# Patient Record
Sex: FEMALE | Race: WHITE | Employment: FULL TIME | URBAN - METROPOLITAN AREA
[De-identification: names, ages, dates, MRNs, and addresses within clinical notes are randomized per-mention and may not be internally consistent; named-entity substitution may affect disease eponyms.]

---

## 2017-10-04 ENCOUNTER — HOSPITAL ENCOUNTER (OUTPATIENT)
Age: 27
Discharge: HOME | End: 2017-10-04
Payer: COMMERCIAL

## 2017-10-04 VITALS
DIASTOLIC BLOOD PRESSURE: 90 MMHG | OXYGEN SATURATION: 100 % | BODY MASS INDEX: 21 KG/M2 | SYSTOLIC BLOOD PRESSURE: 131 MMHG | RESPIRATION RATE: 17 BRPM | WEIGHT: 120 LBS | HEART RATE: 99 BPM | TEMPERATURE: 99 F

## 2017-10-04 DIAGNOSIS — J11.1 INFLUENZA: Primary | ICD-10-CM

## 2017-10-04 PROCEDURE — 87081 CULTURE SCREEN ONLY: CPT | Performed by: PHYSICIAN ASSISTANT

## 2017-10-04 PROCEDURE — 99204 OFFICE O/P NEW MOD 45 MIN: CPT

## 2017-10-04 PROCEDURE — 87430 STREP A AG IA: CPT | Performed by: PHYSICIAN ASSISTANT

## 2017-10-04 PROCEDURE — 94640 AIRWAY INHALATION TREATMENT: CPT

## 2017-10-04 RX ORDER — OSELTAMIVIR PHOSPHATE 75 MG/1
75 CAPSULE ORAL 2 TIMES DAILY
Qty: 10 CAPSULE | Refills: 0 | Status: SHIPPED | OUTPATIENT
Start: 2017-10-04 | End: 2017-10-09

## 2017-10-04 RX ORDER — IPRATROPIUM BROMIDE AND ALBUTEROL SULFATE 2.5; .5 MG/3ML; MG/3ML
3 SOLUTION RESPIRATORY (INHALATION) ONCE
Status: COMPLETED | OUTPATIENT
Start: 2017-10-04 | End: 2017-10-04

## 2017-10-04 NOTE — ED INITIAL ASSESSMENT (HPI)
Monday Pt c/o sore throat, chills/body aches, fever (Tmax 101.7), Pt is a teacher, strep throat in school.

## 2017-10-04 NOTE — ED PROVIDER NOTES
Patient Seen in: [id number] [facility]    History   Patient presents with:  Sore Throat  Fever (infectious)  Body ache and/or chills    Stated Complaint: sore throat    HPI    CHIEF COMPLAINT: Sore throat, cough, congestion and body aches     H Smokeless tobacco: Never Used                      Alcohol use: Yes           0.0 oz/week      Review of Systems    Positive for stated complaint: sore throat  Other systems are as noted in HPI.   Constitutional and vital si inhaler as previously prescribed. She can take over-the-counter cough and cold medication for symptom relief. She should take Tylenol or ibuprofen as needed for pain or fever. She was encouraged to rest and drink plenty of fluids.   A prescription for Ta

## 2022-10-21 NOTE — ED INITIAL ASSESSMENT (HPI)
Pt has had sinus pressure and if able to blow out secretions thick and yellow. Pt with hx of sinus infections and states it feels same.  Denies fevers

## 2024-03-18 NOTE — PATIENT INSTRUCTIONS
1. Rest. Drink plenty of fluids.     2. Supportive care as discussed.     3. Follow up with PMD in 4-5 days for re-eval. Go to the emergency department immediately if symptoms worsen, change, you develop chest discomfort, wheezing, shortness of breath, or if you have any concerns.

## 2024-03-18 NOTE — PROGRESS NOTES
CHIEF COMPLAINT:     Chief Complaint   Patient presents with    Sinus Problem     Sinus infection for multiple days not going away - Entered by patient  Started 5 days, no fevers          HPI:   Debbie Hughes is a 33 year old female who presents for sinus congesiton/runny nose x 5 days.  Patient reports she has been using otc allergy medications and has taken leftover amoxicillin for the past 4 days with no relief.  Denies any fevers, cough wheezing, chest discomfort, or shortness of breath.   Tolerates PO well at home. No n/v/d.  Denies any other aggravating or relieving factors at home. Denies any other treatment attempts prior to arrival.       Current Outpatient Medications   Medication Sig Dispense Refill    sertraline 25 MG Oral Tab Take 0.5 tablets (12.5 mg total) by mouth daily.      albuterol 108 (90 Base) MCG/ACT Inhalation Aero Soln Inhale 2 puffs into the lungs every 4 (four) hours as needed for Wheezing. 1 each 0    Cetirizine HCl (ZYRTEC ALLERGY OR) Take by mouth.      LANSOPRAZOLE 30 MG Oral Capsule Delayed Release TAKE ONE CAPSULE BY MOUTH DAILY 30 capsule 0    predniSONE 20 MG Oral Tab Take 2 PO once daily for 5 days. (Patient not taking: Reported on 3/18/2024) 10 tablet 0      Past Medical History:   Diagnosis Date    ADHD (attention deficit hyperactivity disorder)     Anxiety     Esophageal reflux       Past Surgical History:   Procedure Laterality Date    COLONOSCOPY      GASTRO - DMG      TONSILLECTOMY           Social History     Socioeconomic History    Marital status: Single   Tobacco Use    Smoking status: Never    Smokeless tobacco: Never   Substance and Sexual Activity    Alcohol use: Yes     Alcohol/week: 0.0 standard drinks of alcohol    Drug use: No         REVIEW OF SYSTEMS:   GENERAL: Denies fevers. Notes good appetite  SKIN: no rashes or abnormal skin lesions  HEENT: denies sore throat, + sinus symptoms, Denies ear pain  LUNGS: Denies cough, denies shortness of breath or  wheezing,   CARDIOVASCULAR: denies chest pain or palpitations   GI: denies N/V/C or abdominal pain  NEURO: Denies headaches    EXAM:   /70   Pulse 65   Temp 97.7 °F (36.5 °C)   Resp 18   Ht 5' 4\" (1.626 m)   Wt 155 lb (70.3 kg)   LMP 03/18/2024 (Exact Date)   SpO2 100%   BMI 26.61 kg/m²   GENERAL: well developed, well nourished,in no apparent distress  SKIN: no rashes,no suspicious lesions  HEAD: atraumatic, normocephalic.    EYES: conjunctiva clear,   EARS: TM's intact and without erythema, no bulging, no retraction,no fluid, bony landmarks visualized. No erythema or swelling noted to ear canals or external ears.   NOSE: Nostrils patent, clear nasal mucous, nasal mucosa reddened   THROAT: Oral mucosa pink, moist. Posterior pharynx is not erythematous. No exudates. No tonsillar hypertrophy noted.  No trismus. Uvula midline with no swelling. Voice clear/normal. No stridor  NECK: Supple, non-tender  LUNGS: clear to auscultation bilaterally, no rales, wheezes or rhonchi. Breathing is non labored.  CARDIO: RRR without murmur  EXTREMITIES: no cyanosis, clubbing or edema  LYMPH:  No lymphadenopathy.        ASSESSMENT AND PLAN:       ICD-10-CM    1. Sinus congestion  R09.81       2. Viral URI  J06.9         Discussed physical exam and hpi with pt. No bacterial focus noted on exam. Pt has reassuring physical exam consistent with viral uri. Lungs clear bilat. No respiratory distress noted. Treatment options discussed with patient and explained in detail.  We reviewed symptomatic care at home. Advised not to use leftover abx. The risks, benefits and potential side effects of possible medications were reviewed. Alternatives were discussed. Monitoring parameters and expected course outlined. Patient to call PCP or go to emergency department if symptoms fail to respond as outlined, or worsen in any way. The patient agreed with the plan.    Patient Instructions   1. Rest. Drink plenty of fluids.     2. Supportive  care as discussed.     3. Follow up with PMD in 4-5 days for re-eval. Go to the emergency department immediately if symptoms worsen, change, you develop chest discomfort, wheezing, shortness of breath, or if you have any concerns.        The patient indicates understanding of these issues and agrees to the plan.

## 2024-07-20 NOTE — PROGRESS NOTES
Debbie Hughes is a 34 year old female.    S:  Patient presents today with the following concerns:  Chief Complaint   Patient presents with    Sinus Problem     Over 2 weeks, congestion, post nasal drip, cough  OTC xyzol, flonaise   Hx of bad allergies.  [place]    Blowing nose every 10 minutes.  Ears crackling.    No fevers.  Feels well.  No N/V/D.       Current Outpatient Medications   Medication Sig Dispense Refill    amoxicillin clavulanate 875-125 MG Oral Tab Take 1 tablet by mouth 2 (two) times daily for 7 days. 14 tablet 0    sertraline 25 MG Oral Tab Take 0.5 tablets (12.5 mg total) by mouth daily.      predniSONE 20 MG Oral Tab Take 2 PO once daily for 5 days. (Patient not taking: Reported on 3/18/2024) 10 tablet 0    albuterol 108 (90 Base) MCG/ACT Inhalation Aero Soln Inhale 2 puffs into the lungs every 4 (four) hours as needed for Wheezing. 1 each 0    Cetirizine HCl (ZYRTEC ALLERGY OR) Take by mouth.      LANSOPRAZOLE 30 MG Oral Capsule Delayed Release TAKE ONE CAPSULE BY MOUTH DAILY 30 capsule 0     Patient Active Problem List   Diagnosis    Gastroesophageal reflux disease    Asthma, currently active ([facility])    Attention or concentration deficit    Chronic constipation    Hypercholesterolemia    Atopic rhinitis    Dysfunction of eustachian tube     Family History   Problem Relation Age of Onset    Lipids Mother        REVIEW OF SYSTEMS:  GENERAL: feels well otherwise  SKIN: denies any unusual skin lesions  EYES:denies vision change  LUNGS: denies shortness of breath with exertion  CARDIOVASCULAR: denies chest pain on exertion  GI: denies abdominal pain.  No N/V/D/C  : denies dysuria  MUSCULOSKELETAL: denies back pain  NEURO: sinus headaches    EXAM:  /74   Pulse 99   Temp 98.3 °F (36.8 °C)   Resp 16   Ht 5' 4\" (1.626 m)   Wt 159 lb (72.1 kg)   LMP 07/07/2024 (Exact Date)   SpO2 97%   BMI 27.29 kg/m²   Physical Exam  Constitutional:       General: She is not in acute  distress.     Appearance: Normal appearance. She is not ill-appearing, toxic-appearing or diaphoretic.   HENT:      Head: Normocephalic and atraumatic.      Right Ear: Tympanic membrane and ear canal normal.      Left Ear: Tympanic membrane and ear canal normal.      Nose: Congestion present.      Mouth/Throat:      Mouth: Mucous membranes are moist.      Pharynx: No oropharyngeal exudate or posterior oropharyngeal erythema.      Comments: cobblestoning  Eyes:      Extraocular Movements: Extraocular movements intact.      Conjunctiva/sclera: Conjunctivae normal.      Pupils: Pupils are equal, round, and reactive to light.   Cardiovascular:      Rate and Rhythm: Normal rate and regular rhythm.      Heart sounds: Normal heart sounds.   Pulmonary:      Effort: Pulmonary effort is normal.      Breath sounds: Normal breath sounds.   Musculoskeletal:      Cervical back: Normal range of motion and neck supple. No tenderness.   Lymphadenopathy:      Cervical: No cervical adenopathy.   Skin:     General: Skin is warm and dry.   Neurological:      General: No focal deficit present.      Mental Status: She is alert and oriented to person, place, and time.   Psychiatric:         Mood and Affect: Mood normal.         Behavior: Behavior normal.        ASSESSMENT AND PLAN:  Debbie Hughes is a 34 year old female.  Encounter Diagnosis   Name Primary?    Acute non-recurrent maxillary sinusitis Yes       No results found.     No orders of the defined types were placed in this encounter.    Meds & Refills for this Visit:  Requested Prescriptions     Signed Prescriptions Disp Refills    amoxicillin clavulanate 875-125 MG Oral Tab 14 tablet 0     Sig: Take 1 tablet by mouth 2 (two) times daily for 7 days.     Imaging & Consults:  None  Augmentin as above.  Increase fluids, steam, rest.  Sinus rinses or saline nasal sprays or netti pot.  Tea with honey.   Warm compresses to the sinuses.  Continue Xyzal and Flonase.  May add Azelastine  nasal if needed.  Follow up if symptoms worsen or do not improve.   Patient verbalizes understanding of treatment plan.      No follow-ups on file.

## 2024-12-05 NOTE — PROGRESS NOTES
CHIEF COMPLAINT:     Chief Complaint   Patient presents with    Ear Problem     Right ear pain and post nasal drip.  Symptoms started last night   OTC: None        HPI:   Debbie Hughes is a 34 year old female presents to clinic with symptoms of  Right ear pain, PND.  Reports ear pain started last night.  Pt. Reports s had stomach flu with N/V/D on Tuesday for 24 hours, those symptoms resolved. Reports the increased sneezing/allergy symptoms as missed dose of xyzal.  States pressure popping sensation to R ear.  Also has kids with strep.   Has remote history of strep. Children with ear infection and strep at home.   Current Outpatient Medications   Medication Sig Dispense Refill    sertraline 25 MG Oral Tab Take 0.5 tablets (12.5 mg total) by mouth daily.      predniSONE 20 MG Oral Tab Take 2 PO once daily for 5 days. (Patient not taking: Reported on 3/18/2024) 10 tablet 0    albuterol 108 (90 Base) MCG/ACT Inhalation Aero Soln Inhale 2 puffs into the lungs every 4 (four) hours as needed for Wheezing. 1 each 0    Cetirizine HCl (ZYRTEC ALLERGY OR) Take by mouth.      LANSOPRAZOLE 30 MG Oral Capsule Delayed Release TAKE ONE CAPSULE BY MOUTH DAILY 30 capsule 0      Past Medical History:    ADHD (attention deficit hyperactivity disorder)    Anxiety    Esophageal reflux      Social History:  Social History     Socioeconomic History    Marital status: Single   Tobacco Use    Smoking status: Never    Smokeless tobacco: Never   Substance and Sexual Activity    Alcohol use: Yes     Alcohol/week: 0.0 standard drinks of alcohol    Drug use: No     Social Drivers of Health     Physical Activity: Insufficiently Active (7/11/2019)    Received from [facility], [facility]    Exercise Vital Sign     Days of Exercise per Week: 3 days     Minutes of Exercise per Session: 40 min        REVIEW OF SYSTEMS:   GENERAL HEALTH: feels well otherwise  SKIN: denies any unusual skin lesions or rashes  HEENT: denies ear  pain, See HPI  RESPIRATORY: denies shortness of breath, wheezing.  CARDIOVASCULAR: denies chest pain, palpitations   GI: denies abdominal pain, constipation and diarrhea  NEURO: denies dizziness or lightheadedness    EXAM:   /82   Pulse 96   Temp 98.4 °F (36.9 °C) (Temporal)   Resp 18   Ht 5' 3\" (1.6 m)   Wt 146 lb (66.2 kg)   LMP 11/07/2024 (Exact Date)   SpO2 99%   BMI 25.86 kg/m²   GENERAL: well developed, well nourished,in no apparent distress  SKIN: no rashes,no suspicious lesions  HEAD: atraumatic, normocephalic  EYES: conjunctiva clear, EOM intact  EARS: TM's clear, non-injected, no bulging, retraction, or fluid  NOSE: nostrils patent, no exudates, nasal mucosa pink and noninflamed  THROAT: oral mucosa pink, moist. Posterior pharynx erythematous and injected. NO exudates.  NECK: supple, non-tender  LUNGS: clear to auscultation bilaterally, no wheezes or rhonchi. No crackles/rales, good air movement throughout. Breathing is non labored.  CARDIO: RRR without murmur  EXTREMITIES: no cyanosis, clubbing or edema  LYMPH:Negative anterior cervical and submandibular lymphadenopathy.  No posterior cervical or occipital lymphadenopathy.    No results found for this or any previous visit (from the past 24 hours).    ASSESSMENT AND PLAN:   Debbie Hughes is a 34 year old female who presents with   ASSESSMENT:   Encounter Diagnoses   Name Primary?    Exposure to strep throat Yes    Dysfunction of right eustachian tube     Right ear pain        PLAN: Negative rapid strep.  Continue xyzal, add in flonase.  Motrin per package instructions for pain.  Follow up with PCP if no improvement in 2-3 days.   Comfort care as described in Patient Instructions. If inability to swallow, tolerate secretions, or any difficulty breathing, seek emergent care.        Meds & Refills for this Visit:  Requested Prescriptions      No prescriptions requested or ordered in this encounter       Risks, benefits, and side effects of  medication explained and discussed.    There are no Patient Instructions on file for this visit.    The patient indicates understanding of these issues and agrees to the plan.  The patient is asked to return if sx's persist or worsen.    Increase fluids, Motrin/Tylenol prn, rest.  Patient is to follow up if fever greater than or equal to 100.4 persists for 72 hours.

## 2024-12-15 NOTE — PATIENT INSTRUCTIONS
Augmentin 875 mg twice daily for 10 days.   Encourage fluids, humidifier/vaporizor at bedside, elevate head of bed (sleep with extra pillow), vapor rub to chest, steam therapy if no fever, warm compresses for sinus pressure if no fever, salt water gargles for sore throat, lozenges for sore throat, may try over the counter saline nasal spray or irrigation kit (use distilled water with irrigation kit) for sinus pressure/congestion, get plenty of rest.    Continue allergy medications and nasal spray.       Follow up with your primary care provider if your symptoms fail to improve and resolve as anticipated    Go to the Immediate Care or Emergency Department in event of new or worsening symptoms at any time

## 2024-12-15 NOTE — PROGRESS NOTES
CHIEF COMPLAINT:     Chief Complaint   Patient presents with    Sinus Problem     Definitely infected - Entered by patient  No fevers   Sinus pressure started couple weeks ago        HPI:   Debbie Hughes is a 34 year old female who presents for upper respiratory symptoms for  10+days. (+) nasal congestion, sinus pressure, post nasal drainage, yellow rhinorrhea, upper dental pain.   Sx onset x 2 weeks ago, progressively worsening over course.  On daily antihistamine, nasal spray, irrigation without relief.   Denies fever, no chills/sweats, no n/v/d, no rash, no CP, no SOB/GROSS, no cough.   NO prior h/o sinus surgeries.     Current Outpatient Medications   Medication Sig Dispense Refill    amoxicillin clavulanate 875-125 MG Oral Tab Take 1 tablet by mouth 2 (two) times daily for 10 days. 20 tablet 0    sertraline 25 MG Oral Tab Take 0.5 tablets (12.5 mg total) by mouth daily.      albuterol 108 (90 Base) MCG/ACT Inhalation Aero Soln Inhale 2 puffs into the lungs every 4 (four) hours as needed for Wheezing. 1 each 0    Cetirizine HCl (ZYRTEC ALLERGY OR) Take by mouth.      LANSOPRAZOLE 30 MG Oral Capsule Delayed Release TAKE ONE CAPSULE BY MOUTH DAILY 30 capsule 0    predniSONE 20 MG Oral Tab Take 2 PO once daily for 5 days. (Patient not taking: Reported on 12/15/2024) 10 tablet 0      Past Medical History:    ADHD (attention deficit hyperactivity disorder)    Anxiety    Esophageal reflux      Past Surgical History:   Procedure Laterality Date    Colonoscopy      Gastro - dmg      Tonsillectomy           Social History     Socioeconomic History    Marital status: Single   Tobacco Use    Smoking status: Never    Smokeless tobacco: Never   Substance and Sexual Activity    Alcohol use: Yes     Alcohol/week: 0.0 standard drinks of alcohol    Drug use: No     Social Drivers of Health     Physical Activity: Insufficiently Active (7/11/2019)    [facility]    Exercise Vital  Sign     Days of Exercise per Week: 3 days     Minutes of Exercise per Session: 40 min         REVIEW OF SYSTEMS:   GENERAL: normal appetite  SKIN: no rashes or abnormal skin lesions  HEENT: See HPI  LUNGS: See HPI  CARDIOVASCULAR: denies chest pain or palpitations   GI: denies N/V/C or abdominal pain      EXAM:   /85   Pulse 87   Temp 97.6 °F (36.4 °C)   Resp 18   Ht 5' 3\" (1.6 m)   Wt 147 lb (66.7 kg)   LMP 12/09/2024 (Exact Date)   SpO2 97%   BMI 26.04 kg/m²   GENERAL: well developed, well nourished,in no apparent distress  SKIN: no rashes,no suspicious lesions  HEAD: atraumatic, normocephalic.  (+) tenderness on palpation of maxillary sinuses  EYES: conjunctiva clear, EOM intact  EARS: TM's clear bilaterally  NOSE: Nostrils patent, clear/yellow nasal discharge, nasal mucosa erythematous/edematous   THROAT: Oral mucosa pink, moist. Posterior pharynx is non erythematous. without exudates. Tonsils 1+, thick post nasal drainage with cobblestoning, uvula midline.   NECK: Supple, non-tender  LUNGS: clear to auscultation bilaterally, no wheezes or rhonchi. Breathing is non labored.  CARDIO: RRR without murmur  EXTREMITIES: no cyanosis, clubbing or edema  LYMPH:  shotty ant cervical LAD.     ASSESSMENT AND PLAN:   Debbie Hughes is a 34 year old female who presents with upper respiratory symptoms that are consistent with    ASSESSMENT:   Encounter Diagnosis   Name Primary?    Acute non-recurrent maxillary sinusitis Yes       PLAN: Meds as below.  Comfort care as described in Patient Instructions    Meds & Refills for this Visit:  Requested Prescriptions     Signed Prescriptions Disp Refills    amoxicillin clavulanate 875-125 MG Oral Tab 20 tablet 0     Sig: Take 1 tablet by mouth 2 (two) times daily for 10 days.     Risks, benefits, and side effects of medication explained and discussed.    The patient indicates understanding of these issues and agrees to the plan.  The patient is asked to f/u with PCP  if sx's persist or worsen.  Patient Instructions    Augmentin 875 mg twice daily for 10 days.   Encourage fluids, humidifier/vaporizor at bedside, elevate head of bed (sleep with extra pillow), vapor rub to chest, steam therapy if no fever, warm compresses for sinus pressure if no fever, salt water gargles for sore throat, lozenges for sore throat, may try over the counter saline nasal spray or irrigation kit (use distilled water with irrigation kit) for sinus pressure/congestion, get plenty of rest.    Continue allergy medications and nasal spray.       Follow up with your primary care provider if your symptoms fail to improve and resolve as anticipated    Go to the Immediate Care or Emergency Department in event of new or worsening symptoms at any time

## (undated) NOTE — LETTER
[facility] IN [place]  [id number]  Dept: [phone]  Dept Fax: [phone]      October 4, 2017    Patient: Elizabeth Ta   Date of Visit: 10/4/2017       To Whom It May Concern:    Romeo Banuelos was seen and henrietta